# Patient Record
Sex: FEMALE | Race: WHITE | Employment: FULL TIME | URBAN - METROPOLITAN AREA
[De-identification: names, ages, dates, MRNs, and addresses within clinical notes are randomized per-mention and may not be internally consistent; named-entity substitution may affect disease eponyms.]

---

## 2017-10-20 DIAGNOSIS — C73 PAPILLARY CARCINOMA OF THYROID (HCC): ICD-10-CM

## 2017-10-20 LAB
ANION GAP SERPL CALCULATED.3IONS-SCNC: 13 MEQ/L (ref 7–13)
BUN BLDV-MCNC: 20 MG/DL (ref 8–23)
CALCIUM SERPL-MCNC: 9.5 MG/DL (ref 8.6–10.2)
CHLORIDE BLD-SCNC: 102 MEQ/L (ref 98–107)
CO2: 27 MEQ/L (ref 22–29)
CREAT SERPL-MCNC: 0.53 MG/DL (ref 0.5–0.9)
GFR AFRICAN AMERICAN: >60
GFR NON-AFRICAN AMERICAN: >60
GLUCOSE BLD-MCNC: 90 MG/DL (ref 74–109)
POTASSIUM SERPL-SCNC: 4.3 MEQ/L (ref 3.5–5.1)
SODIUM BLD-SCNC: 142 MEQ/L (ref 132–144)
T4 FREE: 1.72 NG/DL (ref 0.93–1.7)
TSH SERPL DL<=0.05 MIU/L-ACNC: 0.55 UIU/ML (ref 0.27–4.2)

## 2017-10-23 ENCOUNTER — OFFICE VISIT (OUTPATIENT)
Dept: SURGERY | Age: 77
End: 2017-10-23

## 2017-10-23 VITALS
HEIGHT: 65 IN | SYSTOLIC BLOOD PRESSURE: 132 MMHG | DIASTOLIC BLOOD PRESSURE: 70 MMHG | WEIGHT: 125 LBS | HEART RATE: 67 BPM | BODY MASS INDEX: 20.83 KG/M2

## 2017-10-23 DIAGNOSIS — E89.0 POSTOPERATIVE HYPOTHYROIDISM: ICD-10-CM

## 2017-10-23 DIAGNOSIS — C73 PAPILLARY CARCINOMA OF THYROID (HCC): Primary | ICD-10-CM

## 2017-10-23 LAB — THYROGLOBULIN BY LC-MS/MS, SERUM/PLASMA: <0.5 NG/ML (ref 1.3–31.8)

## 2017-10-23 PROCEDURE — 1090F PRES/ABSN URINE INCON ASSESS: CPT | Performed by: INTERNAL MEDICINE

## 2017-10-23 PROCEDURE — 4040F PNEUMOC VAC/ADMIN/RCVD: CPT | Performed by: INTERNAL MEDICINE

## 2017-10-23 PROCEDURE — 1036F TOBACCO NON-USER: CPT | Performed by: INTERNAL MEDICINE

## 2017-10-23 PROCEDURE — G8427 DOCREV CUR MEDS BY ELIG CLIN: HCPCS | Performed by: INTERNAL MEDICINE

## 2017-10-23 PROCEDURE — G8484 FLU IMMUNIZE NO ADMIN: HCPCS | Performed by: INTERNAL MEDICINE

## 2017-10-23 PROCEDURE — G8400 PT W/DXA NO RESULTS DOC: HCPCS | Performed by: INTERNAL MEDICINE

## 2017-10-23 PROCEDURE — 99213 OFFICE O/P EST LOW 20 MIN: CPT | Performed by: INTERNAL MEDICINE

## 2017-10-23 PROCEDURE — 1123F ACP DISCUSS/DSCN MKR DOCD: CPT | Performed by: INTERNAL MEDICINE

## 2017-10-23 PROCEDURE — G8420 CALC BMI NORM PARAMETERS: HCPCS | Performed by: INTERNAL MEDICINE

## 2017-10-23 RX ORDER — LEVOTHYROXINE SODIUM 75 MCG
TABLET ORAL
Qty: 30 TABLET | Refills: 11 | Status: SHIPPED | OUTPATIENT
Start: 2017-10-23 | End: 2018-10-22 | Stop reason: SDUPTHER

## 2017-10-23 NOTE — PROGRESS NOTES
Subjective:      Patient ID: Cinthya Escalante is a 69 y.o. female. Other   This is a chronic (hypothyroidism) problem. The current episode started more than 1 year ago. The problem has been unchanged. Associated symptoms include fatigue. Exacerbated by: total thyroidectomy. Treatments tried: synthyroid. The treatment provided moderate relief. Pt c/p dry mouth and dry eyes     Patient Active Problem List   Diagnosis    Papillary carcinoma of thyroid ([address]    Hypothyroidism    Hashimoto's thyroiditis       Current Outpatient Prescriptions:     SYNTHROID 75 MCG tablet, TAKE ONE TABLET BY MOUTH EVERY DAY, Disp: 30 tablet, Rfl: 11    lisinopril (PRINIVIL;ZESTRIL) 20 MG tablet, Take 20 mg by mouth daily, Disp: , Rfl:     atorvastatin (LIPITOR) 20 MG tablet, Take 20 mg by mouth daily. , Disp: , Rfl:       Review of Systems   Constitutional: Positive for fatigue. All other systems reviewed and are negative. Vitals:    10/23/17 0859 10/23/17 0905   BP: (!) 150/71 132/70   Site: Right Arm    Position: Sitting    Cuff Size: Medium Adult    Pulse: 67    Weight: 125 lb (56.7 kg)    Height: 5' 5\" (1.651 m)        Objective:   Physical Exam   Constitutional: She appears well-developed and well-nourished. HENT:   Head: Normocephalic and atraumatic. Eyes: Conjunctivae are normal.   Neck: Neck supple. Cardiovascular: Normal rate and normal heart sounds. Pulmonary/Chest: Effort normal and breath sounds normal.   Musculoskeletal: Normal range of motion. Neurological: She is alert. Skin: Skin is warm. Psychiatric: She has a normal mood and affect. Results for Jennifer Garcia (MRN [id number]) as of 10/23/2017 09:20   Ref.  Range 10/20/2017 09:16   Sodium Latest Ref Range: 132 - 144 mEq/L 142   Potassium Latest Ref Range: 3.5 - 5.1 mEq/L 4.3   Chloride Latest Ref Range: 98 - 107 mEq/L 102   CO2 Latest Ref Range: 22 - 29 mEq/L 27   BUN Latest Ref Range: 8 - 23 mg/dL 20   Creatinine Latest Ref Range: 0.50 - 0.90 mg/dL 0.53   Anion Gap Latest Ref Range: 7 - 13 mEq/L 13   GFR Non- Latest Ref Range: >60  >60.0   GFR African American Latest Ref Range: >60  >60.0   Glucose Latest Ref Range: 74 - 109 mg/dL 90   Calcium Latest Ref Range: 8.6 - 10.2 mg/dL 9.5   TSH Latest Ref Range: 0.270 - 4.200 uIU/mL 0.551   T4 Free Latest Ref Range: 0.93 - 1.70 ng/dL 1.72 (H)       Assessment:      1. Papillary carcinoma of thyroid (HCC)  T4, Free    TSH without Reflex    Thyroglobulin   2.  Postoperative hypothyroidism              Plan:      Orders Placed This Encounter   Procedures    T4, Free     Standing Status:   Future     Standing Expiration Date:   10/23/2018    TSH without Reflex     Standing Status:   Future     Standing Expiration Date:   10/23/2018    Thyroglobulin     Standing Status:   Future     Standing Expiration Date:   10/23/2018     Orders Placed This Encounter   Medications    SYNTHROID 75 MCG tablet     Sig: TAKE ONE TABLET BY MOUTH EVERY DAY     Dispense:  30 tablet     Refill:  11     F/u in 12 months

## 2018-10-22 ENCOUNTER — OFFICE VISIT (OUTPATIENT)
Dept: ENDOCRINOLOGY | Age: 78
End: 2018-10-22
Payer: MEDICARE

## 2018-10-22 VITALS
HEIGHT: 65 IN | SYSTOLIC BLOOD PRESSURE: 122 MMHG | OXYGEN SATURATION: 98 % | RESPIRATION RATE: 16 BRPM | BODY MASS INDEX: 19.76 KG/M2 | HEART RATE: 65 BPM | WEIGHT: 118.6 LBS | DIASTOLIC BLOOD PRESSURE: 60 MMHG

## 2018-10-22 DIAGNOSIS — E89.0 POSTOPERATIVE HYPOTHYROIDISM: Primary | ICD-10-CM

## 2018-10-22 DIAGNOSIS — R63.4 WEIGHT LOSS: ICD-10-CM

## 2018-10-22 DIAGNOSIS — C73 PAPILLARY CARCINOMA OF THYROID (HCC): ICD-10-CM

## 2018-10-22 LAB
T4 FREE: 1.85 NG/DL (ref 0.93–1.7)
TSH SERPL DL<=0.05 MIU/L-ACNC: 0.38 UIU/ML (ref 0.27–4.2)

## 2018-10-22 PROCEDURE — G8427 DOCREV CUR MEDS BY ELIG CLIN: HCPCS | Performed by: INTERNAL MEDICINE

## 2018-10-22 PROCEDURE — 1123F ACP DISCUSS/DSCN MKR DOCD: CPT | Performed by: INTERNAL MEDICINE

## 2018-10-22 PROCEDURE — 99213 OFFICE O/P EST LOW 20 MIN: CPT | Performed by: INTERNAL MEDICINE

## 2018-10-22 PROCEDURE — 1101F PT FALLS ASSESS-DOCD LE1/YR: CPT | Performed by: INTERNAL MEDICINE

## 2018-10-22 PROCEDURE — 1090F PRES/ABSN URINE INCON ASSESS: CPT | Performed by: INTERNAL MEDICINE

## 2018-10-22 PROCEDURE — 4040F PNEUMOC VAC/ADMIN/RCVD: CPT | Performed by: INTERNAL MEDICINE

## 2018-10-22 PROCEDURE — G8484 FLU IMMUNIZE NO ADMIN: HCPCS | Performed by: INTERNAL MEDICINE

## 2018-10-22 PROCEDURE — 1036F TOBACCO NON-USER: CPT | Performed by: INTERNAL MEDICINE

## 2018-10-22 PROCEDURE — G8400 PT W/DXA NO RESULTS DOC: HCPCS | Performed by: INTERNAL MEDICINE

## 2018-10-22 PROCEDURE — G8420 CALC BMI NORM PARAMETERS: HCPCS | Performed by: INTERNAL MEDICINE

## 2018-10-22 RX ORDER — LEVOTHYROXINE SODIUM 75 MCG
TABLET ORAL
Qty: 30 TABLET | Refills: 11 | Status: SHIPPED | OUTPATIENT
Start: 2018-10-22 | End: 2019-10-22 | Stop reason: SDUPTHER

## 2018-10-28 LAB — THYROGLOBULIN BY LC-MS/MS, SERUM/PLASMA: <0.5 NG/ML (ref 1.3–31.8)

## 2018-11-08 ENCOUNTER — TELEPHONE (OUTPATIENT)
Dept: ENDOCRINOLOGY | Age: 78
End: 2018-11-08

## 2018-11-08 PROBLEM — C91.10 CHRONIC LYMPHOCYTIC LEUKEMIA OF B-CELL TYPE NOT HAVING ACHIEVED REMISSION (HCC): Status: ACTIVE | Noted: 2018-11-08

## 2018-11-19 DIAGNOSIS — C91.10 CHRONIC LYMPHOCYTIC LEUKEMIA OF B-CELL TYPE NOT HAVING ACHIEVED REMISSION (HCC): ICD-10-CM

## 2018-11-19 LAB
ALBUMIN SERPL-MCNC: 4.3 G/DL (ref 3.9–4.9)
ALP BLD-CCNC: 53 U/L (ref 40–130)
ALT SERPL-CCNC: 17 U/L (ref 0–33)
ANION GAP SERPL CALCULATED.3IONS-SCNC: 12 MEQ/L (ref 7–13)
AST SERPL-CCNC: 21 U/L (ref 0–35)
BILIRUB SERPL-MCNC: 0.5 MG/DL (ref 0–1.2)
BUN BLDV-MCNC: 20 MG/DL (ref 8–23)
CALCIUM SERPL-MCNC: 10.2 MG/DL (ref 8.6–10.2)
CHLORIDE BLD-SCNC: 105 MEQ/L (ref 98–107)
CO2: 26 MEQ/L (ref 22–29)
CREAT SERPL-MCNC: 0.56 MG/DL (ref 0.5–0.9)
GFR AFRICAN AMERICAN: >60
GFR NON-AFRICAN AMERICAN: >60
GLOBULIN: 2.5 G/DL (ref 2.3–3.5)
GLUCOSE BLD-MCNC: 69 MG/DL (ref 74–109)
POTASSIUM SERPL-SCNC: 4.8 MEQ/L (ref 3.5–5.1)
SODIUM BLD-SCNC: 143 MEQ/L (ref 132–144)
TOTAL PROTEIN: 6.8 G/DL (ref 6.4–8.1)

## 2019-01-09 ENCOUNTER — TELEPHONE (OUTPATIENT)
Dept: INTERNAL MEDICINE CLINIC | Age: 79
End: 2019-01-09

## 2019-05-01 ENCOUNTER — TELEPHONE (OUTPATIENT)
Dept: ENDOCRINOLOGY | Age: 79
End: 2019-05-01

## 2019-05-01 NOTE — TELEPHONE ENCOUNTER
Patient calls with questions on dose for levothyroxine. States that Dr. Harley Shields sent over a note regarding same. She would like to speak to  To lower her dose because she has lost weight.

## 2019-05-02 DIAGNOSIS — E03.9 ACQUIRED HYPOTHYROIDISM: Primary | ICD-10-CM

## 2019-05-03 DIAGNOSIS — E89.0 POSTOPERATIVE HYPOTHYROIDISM: ICD-10-CM

## 2019-05-03 DIAGNOSIS — E03.9 ACQUIRED HYPOTHYROIDISM: ICD-10-CM

## 2019-05-03 LAB
T4 FREE: 1.6 NG/DL (ref 0.84–1.68)
TSH SERPL DL<=0.05 MIU/L-ACNC: 0.32 UIU/ML (ref 0.44–3.86)

## 2019-05-04 LAB — THYROGLOBULIN AB: 23.1 IU/ML (ref 0–4)

## 2019-05-08 ENCOUNTER — TELEPHONE (OUTPATIENT)
Dept: ENDOCRINOLOGY | Age: 79
End: 2019-05-08

## 2019-10-11 DIAGNOSIS — E89.0 POSTOPERATIVE HYPOTHYROIDISM: ICD-10-CM

## 2019-10-11 LAB
T4 FREE: 1.49 NG/DL (ref 0.84–1.68)
TSH SERPL DL<=0.05 MIU/L-ACNC: 1.96 UIU/ML (ref 0.44–3.86)

## 2019-10-21 LAB — THYROGLOBULIN BY LC-MS/MS, SERUM/PLASMA: <0.5 NG/ML (ref 1.3–31.8)

## 2019-10-22 ENCOUNTER — OFFICE VISIT (OUTPATIENT)
Dept: ENDOCRINOLOGY | Age: 79
End: 2019-10-22
Payer: MEDICARE

## 2019-10-22 VITALS
HEIGHT: 65 IN | SYSTOLIC BLOOD PRESSURE: 131 MMHG | WEIGHT: 119 LBS | DIASTOLIC BLOOD PRESSURE: 70 MMHG | BODY MASS INDEX: 19.83 KG/M2 | HEART RATE: 72 BPM

## 2019-10-22 DIAGNOSIS — R63.4 WEIGHT LOSS: ICD-10-CM

## 2019-10-22 DIAGNOSIS — E89.0 POSTOPERATIVE HYPOTHYROIDISM: Primary | ICD-10-CM

## 2019-10-22 PROCEDURE — G8400 PT W/DXA NO RESULTS DOC: HCPCS | Performed by: INTERNAL MEDICINE

## 2019-10-22 PROCEDURE — 4040F PNEUMOC VAC/ADMIN/RCVD: CPT | Performed by: INTERNAL MEDICINE

## 2019-10-22 PROCEDURE — G8420 CALC BMI NORM PARAMETERS: HCPCS | Performed by: INTERNAL MEDICINE

## 2019-10-22 PROCEDURE — 1036F TOBACCO NON-USER: CPT | Performed by: INTERNAL MEDICINE

## 2019-10-22 PROCEDURE — 1123F ACP DISCUSS/DSCN MKR DOCD: CPT | Performed by: INTERNAL MEDICINE

## 2019-10-22 PROCEDURE — G8484 FLU IMMUNIZE NO ADMIN: HCPCS | Performed by: INTERNAL MEDICINE

## 2019-10-22 PROCEDURE — 1090F PRES/ABSN URINE INCON ASSESS: CPT | Performed by: INTERNAL MEDICINE

## 2019-10-22 PROCEDURE — 99213 OFFICE O/P EST LOW 20 MIN: CPT | Performed by: INTERNAL MEDICINE

## 2019-10-22 PROCEDURE — G8427 DOCREV CUR MEDS BY ELIG CLIN: HCPCS | Performed by: INTERNAL MEDICINE

## 2019-10-22 RX ORDER — LEVOTHYROXINE SODIUM 75 MCG
TABLET ORAL
Qty: 30 TABLET | Refills: 11 | Status: SHIPPED | OUTPATIENT
Start: 2019-10-22 | End: 2019-10-22 | Stop reason: SDUPTHER

## 2019-10-22 RX ORDER — LEVOTHYROXINE SODIUM 0.07 MG/1
TABLET ORAL
Qty: 30 TABLET | Refills: 11 | Status: SHIPPED | OUTPATIENT
Start: 2019-10-22 | End: 2020-10-22 | Stop reason: SDUPTHER

## 2020-10-12 DIAGNOSIS — E89.0 POSTOPERATIVE HYPOTHYROIDISM: ICD-10-CM

## 2020-10-12 LAB
T4 FREE: 1.5 NG/DL (ref 0.84–1.68)
TSH SERPL DL<=0.05 MIU/L-ACNC: 1.65 UIU/ML (ref 0.44–3.86)

## 2020-10-16 LAB — THYROGLOBULIN BY LC-MS/MS, SERUM/PLASMA: <0.5 NG/ML (ref 1.3–31.8)

## 2020-10-22 ENCOUNTER — OFFICE VISIT (OUTPATIENT)
Dept: ENDOCRINOLOGY | Age: 80
End: 2020-10-22
Payer: MEDICARE

## 2020-10-22 VITALS
BODY MASS INDEX: 20.33 KG/M2 | SYSTOLIC BLOOD PRESSURE: 134 MMHG | HEART RATE: 60 BPM | DIASTOLIC BLOOD PRESSURE: 79 MMHG | WEIGHT: 122 LBS | OXYGEN SATURATION: 98 % | HEIGHT: 65 IN

## 2020-10-22 PROCEDURE — G8484 FLU IMMUNIZE NO ADMIN: HCPCS | Performed by: INTERNAL MEDICINE

## 2020-10-22 PROCEDURE — 1090F PRES/ABSN URINE INCON ASSESS: CPT | Performed by: INTERNAL MEDICINE

## 2020-10-22 PROCEDURE — 1123F ACP DISCUSS/DSCN MKR DOCD: CPT | Performed by: INTERNAL MEDICINE

## 2020-10-22 PROCEDURE — 1036F TOBACCO NON-USER: CPT | Performed by: INTERNAL MEDICINE

## 2020-10-22 PROCEDURE — G8427 DOCREV CUR MEDS BY ELIG CLIN: HCPCS | Performed by: INTERNAL MEDICINE

## 2020-10-22 PROCEDURE — G8420 CALC BMI NORM PARAMETERS: HCPCS | Performed by: INTERNAL MEDICINE

## 2020-10-22 PROCEDURE — G8400 PT W/DXA NO RESULTS DOC: HCPCS | Performed by: INTERNAL MEDICINE

## 2020-10-22 PROCEDURE — 99213 OFFICE O/P EST LOW 20 MIN: CPT | Performed by: INTERNAL MEDICINE

## 2020-10-22 PROCEDURE — 4040F PNEUMOC VAC/ADMIN/RCVD: CPT | Performed by: INTERNAL MEDICINE

## 2020-10-22 RX ORDER — LEVOTHYROXINE SODIUM 0.07 MG/1
TABLET ORAL
Qty: 30 TABLET | Refills: 11 | Status: SHIPPED | OUTPATIENT
Start: 2020-10-22 | End: 2021-10-21 | Stop reason: SDUPTHER

## 2020-10-22 NOTE — PROGRESS NOTES
Subjective:      Patient ID: Unruly Munson is a 79 y.o. female. 12 follow-up on hypothyroidism history of thyroidectomy for papillary thyroid carcinoma labs including thyroglobulin levels have been stable requesting refills  Other   This is a chronic (Hypothyroidism) problem. The current episode started more than 1 year ago. The problem has been unchanged. Exacerbated by: Thyroidectomy. Treatments tried: Levothyroxine. The treatment provided moderate relief. Results for Nilda Tripp (MRN [id number]) as of 10/22/2020 09:44   Ref. Range 10/11/2019 09:54 10/12/2020 08:27   TSH Latest Ref Range: 0.440 - 3.860 uIU/mL 1.960 1.650   T4 Free Latest Ref Range: 0.84 - 1.68 ng/dL 1.49 1.50   Thyroglobulin by LC-MS/MS, Serum/Plasma Latest Ref Range: 1.3 - 31.8 ng/mL <0.5 (L) <0.5 (L)     Patient Active Problem List   Diagnosis    Papillary carcinoma of thyroid (HCC)    Hypothyroidism    Hashimoto's thyroiditis    Chronic lymphocytic leukemia of B-cell type not having achieved remission (HCC)     No Known Allergies      Current Outpatient Medications:     levothyroxine (SYNTHROID) 75 MCG tablet, TAKE ONE TABLET BY MOUTH 6 days a week, Disp: 30 tablet, Rfl: 11    calcium carbonate 600 MG TABS tablet, Take 2 tablets by mouth daily, Disp: , Rfl:     Omega-3 Fatty Acids (FISH OIL) 1000 MG CAPS, Take 1,000 mg by mouth 2 times daily, Disp: , Rfl:     Multiple Vitamins-Minerals (MULTIVITAMIN WOMEN 50+) TABS, Take 1 tablet by mouth daily, Disp: , Rfl:     vitamin B-12 (CYANOCOBALAMIN) 1000 MCG tablet, Take 1,000 mcg by mouth daily, Disp: , Rfl:     lisinopril (PRINIVIL;ZESTRIL) 20 MG tablet, Take 20 mg by mouth daily, Disp: , Rfl:     atorvastatin (LIPITOR) 20 MG tablet, Take 20 mg by mouth daily. , Disp: , Rfl:     Review of Systems   Endocrine: Negative. All other systems reviewed and are negative.       Vitals:    10/22/20 0925   BP: 134/79   Pulse: 60   SpO2: 98%   Weight: 122 lb (55.3 kg)   Height: 5' 5\" (1.651 m)       Objective:   Physical Exam  Constitutional:       Appearance: Normal appearance. She is normal weight. HENT:      Head: Normocephalic and atraumatic. Neck:      Musculoskeletal: Normal range of motion and neck supple. Cardiovascular:      Rate and Rhythm: Normal rate. Musculoskeletal: Normal range of motion. Neurological:      General: No focal deficit present. Mental Status: She is alert. Psychiatric:         Mood and Affect: Mood is anxious. Assessment:       Diagnosis Orders   1.  Postoperative hypothyroidism  T4, Free    TSH with Reflex    Thyroglobulin    Anti-Thyroglobulin Antibody    levothyroxine (SYNTHROID) 75 MCG tablet           Plan:      Orders Placed This Encounter   Procedures    T4, Free     Standing Status:   Future     Standing Expiration Date:   10/22/2021    TSH with Reflex     Standing Status:   Future     Standing Expiration Date:   10/22/2021    Thyroglobulin     Standing Status:   Future     Standing Expiration Date:   10/22/2021    Anti-Thyroglobulin Antibody     Standing Status:   Future     Standing Expiration Date:   10/22/2021     Orders Placed This Encounter   Medications    levothyroxine (SYNTHROID) 75 MCG tablet     Sig: TAKE ONE TABLET BY MOUTH 6 days a week     Dispense:  30 tablet     Refill:  Delano Morales MD

## 2021-10-21 ENCOUNTER — OFFICE VISIT (OUTPATIENT)
Dept: ENDOCRINOLOGY | Age: 81
End: 2021-10-21
Payer: MEDICARE

## 2021-10-21 VITALS
BODY MASS INDEX: 20.16 KG/M2 | OXYGEN SATURATION: 96 % | HEART RATE: 71 BPM | HEIGHT: 65 IN | DIASTOLIC BLOOD PRESSURE: 75 MMHG | WEIGHT: 121 LBS | SYSTOLIC BLOOD PRESSURE: 132 MMHG

## 2021-10-21 DIAGNOSIS — E89.0 POSTOPERATIVE HYPOTHYROIDISM: Primary | ICD-10-CM

## 2021-10-21 DIAGNOSIS — C73 THYROID CANCER (HCC): ICD-10-CM

## 2021-10-21 PROCEDURE — 4040F PNEUMOC VAC/ADMIN/RCVD: CPT | Performed by: INTERNAL MEDICINE

## 2021-10-21 PROCEDURE — 1123F ACP DISCUSS/DSCN MKR DOCD: CPT | Performed by: INTERNAL MEDICINE

## 2021-10-21 PROCEDURE — 1036F TOBACCO NON-USER: CPT | Performed by: INTERNAL MEDICINE

## 2021-10-21 PROCEDURE — G8484 FLU IMMUNIZE NO ADMIN: HCPCS | Performed by: INTERNAL MEDICINE

## 2021-10-21 PROCEDURE — G8420 CALC BMI NORM PARAMETERS: HCPCS | Performed by: INTERNAL MEDICINE

## 2021-10-21 PROCEDURE — 99213 OFFICE O/P EST LOW 20 MIN: CPT | Performed by: INTERNAL MEDICINE

## 2021-10-21 PROCEDURE — G8400 PT W/DXA NO RESULTS DOC: HCPCS | Performed by: INTERNAL MEDICINE

## 2021-10-21 PROCEDURE — 1090F PRES/ABSN URINE INCON ASSESS: CPT | Performed by: INTERNAL MEDICINE

## 2021-10-21 PROCEDURE — G8427 DOCREV CUR MEDS BY ELIG CLIN: HCPCS | Performed by: INTERNAL MEDICINE

## 2021-10-21 RX ORDER — CHLORAL HYDRATE 500 MG
1000 CAPSULE ORAL
COMMUNITY

## 2021-10-21 RX ORDER — LEVOTHYROXINE SODIUM 0.07 MG/1
TABLET ORAL
Qty: 30 TABLET | Refills: 11 | Status: SHIPPED | OUTPATIENT
Start: 2021-10-21 | End: 2022-10-20 | Stop reason: SDUPTHER

## 2021-10-21 RX ORDER — ATORVASTATIN CALCIUM 10 MG/1
TABLET, FILM COATED ORAL
COMMUNITY
Start: 2021-10-19

## 2021-10-21 RX ORDER — LISINOPRIL 20 MG/1
20 TABLET ORAL DAILY
COMMUNITY
Start: 2021-08-30 | End: 2022-10-20

## 2021-10-21 RX ORDER — LEVOTHYROXINE SODIUM 0.07 MG/1
TABLET ORAL
Qty: 30 TABLET | Refills: 11 | Status: SHIPPED | OUTPATIENT
Start: 2021-10-21 | End: 2021-10-21 | Stop reason: SDUPTHER

## 2021-10-21 ASSESSMENT — ENCOUNTER SYMPTOMS: SWOLLEN GLANDS: 0

## 2021-10-21 NOTE — PROGRESS NOTES
10/21/2021    Assessment:       Diagnosis Orders   1. Postoperative hypothyroidism  T4, Free    TSH without Reflex    Anti-Thyroglobulin Antibody    Thyroglobulin    levothyroxine (SYNTHROID) 75 MCG tablet    DISCONTINUED: levothyroxine (SYNTHROID) 75 MCG tablet   2. Thyroid cancer ([address]           PLAN:     Orders Placed This Encounter   Procedures    T4, Free     Standing Status:   Future     Standing Expiration Date:   10/21/2022    TSH without Reflex     Standing Status:   Future     Standing Expiration Date:   10/21/2022    Anti-Thyroglobulin Antibody     Standing Status:   Future     Standing Expiration Date:   10/21/2022    Thyroglobulin     Standing Status:   Future     Standing Expiration Date:   10/21/2022     Continue Synthroid 75 mcg 6 days a week follow-up in 12 months        Subjective:     Chief Complaint   Patient presents with    Hypothyroidism     Vitals:    10/21/21 0859   BP: 132/75   Pulse: 71   SpO2: 96%   Weight: 121 lb (54.9 kg)   Height: 5' 5\" (1.651 m)     Wt Readings from Last 3 Encounters:   10/21/21 121 lb (54.9 kg)   10/22/20 122 lb (55.3 kg)   10/22/19 119 lb (54 kg)     BP Readings from Last 3 Encounters:   10/21/21 132/75   10/22/20 134/79   10/22/19 131/70     Follow-up on hypothyroidism status post thyroidectomy for thyroid carcinoma labs reviewed thyroglobulin level has been stable denies any neck pain heat or cold intolerance    Other  This is a chronic (Hypothyroidism) problem. The current episode started more than 1 year ago. The problem has been waxing and waning. Associated symptoms include fatigue. Pertinent negatives include no neck pain or swollen glands. Exacerbated by: Thyroidectomy. Treatments tried: Synthroid. The treatment provided moderate relief. Results for [employer] (MRN [id number]) as of 10/21/2021 09:33   Ref.  Range 10/11/2019 09:54 10/12/2020 08:27 10/12/2021 09:00   TSH Latest Ref Range: 0.440 - 3.860 uIU/mL 1.960 1.650 0.579   T4 Free Latest Ref Range: 0.84 - 1.68 ng/dL 1.49 1.50 1.46   Thyroglobulin Antibody Latest Ref Range: 0.0 - 40.0 IU/mL   23.0   Thyroglobulin by LC-MS/MS, Serum/Plasma Latest Ref Range: 1.3 - 31.8 ng/mL <0.5 (L) <0.5 (L) <0.5 (L)       Past Medical History:   Diagnosis Date    Arthritis     Cancer ([facility]     thyroid,papillary type    Hypothyroidism     Osteopenia      Past Surgical History:   Procedure Laterality Date    APPENDECTOMY      BREAST BIOPSY Right     BENIGN    CATARACT REMOVAL Bilateral 07/2016    LITHOTRIPSY      KIDNEY STONES    TOTAL KNEE ARTHROPLASTY Right 01/14/2019     Social History     Socioeconomic History    Marital status:      Spouse name: Not on file    Number of children: Not on file    Years of education: Not on file    Highest education level: Not on file   Occupational History    Not on file   Tobacco Use    Smoking status: Never Smoker    Smokeless tobacco: Never Used   Substance and Sexual Activity    Alcohol use: No    Drug use: Not on file    Sexual activity: Not on file   Other Topics Concern    Not on file   Social History Narrative    Not on file     Social Determinants of Health     Financial Resource Strain:     Difficulty of Paying Living Expenses:    Food Insecurity:     Worried About Running Out of Food in the Last Year:     Ran Out of Food in the Last Year:    Transportation Needs:     Lack of Transportation (Medical):      Lack of Transportation (Non-Medical):    Physical Activity:     Days of Exercise per Week:     Minutes of Exercise per Session:    Stress:     Feeling of Stress :    Social Connections:     Frequency of Communication with Friends and Family:     Frequency of Social Gatherings with Friends and Family:     Attends Congregational Services:     Active Member of Clubs or Organizations:     Attends Club or Organization Meetings:     Marital Status:    Intimate Partner Violence:     Fear of Current or Ex-Partner:     Emotionally Abused:     Physically Abused:     Sexually Abused:      No family history on file. No Known Allergies    Current Outpatient Medications:     atorvastatin (LIPITOR) 10 MG tablet, , Disp: , Rfl:     Omega-3 Fatty Acids (FISH OIL) 1000 MG CAPS, Take 1,000 mg by mouth, Disp: , Rfl:     levothyroxine (SYNTHROID) 75 MCG tablet, TAKE ONE TABLET BY MOUTH 6 days a week, Disp: 30 tablet, Rfl: 11    calcium carbonate 600 MG TABS tablet, Take 2 tablets by mouth daily, Disp: , Rfl:     Multiple Vitamins-Minerals (MULTIVITAMIN WOMEN 50+) TABS, Take 1 tablet by mouth daily, Disp: , Rfl:     vitamin B-12 (CYANOCOBALAMIN) 1000 MCG tablet, Take 1,000 mcg by mouth daily, Disp: , Rfl:     lisinopril (PRINIVIL;ZESTRIL) 20 MG tablet, Take 20 mg by mouth daily, Disp: , Rfl:     atorvastatin (LIPITOR) 20 MG tablet, Take 20 mg by mouth daily.   , Disp: , Rfl:     calcium carbonate 1500 (600 Ca) MG TABS tablet, Take 2 tablets by mouth (Patient not taking: Reported on 10/21/2021), Disp: , Rfl:     lisinopril (PRINIVIL;ZESTRIL) 20 MG tablet, Take 20 mg by mouth daily (Patient not taking: Reported on 10/21/2021), Disp: , Rfl:     Omega-3 Fatty Acids (FISH OIL) 1000 MG CAPS, Take 1,000 mg by mouth 2 times daily (Patient not taking: Reported on 10/21/2021), Disp: , Rfl:   Lab Results   Component Value Date     04/30/2019    K 5.0 (H) 04/30/2019     04/30/2019    CO2 28 04/30/2019    BUN 19 04/30/2019    CREATININE 0.55 04/30/2019    GLUCOSE 96 04/30/2019    CALCIUM 9.6 04/30/2019    PROT 6.6 04/30/2019    LABALBU 4.3 04/30/2019    BILITOT 0.5 04/30/2019    ALKPHOS 66 04/30/2019    AST 25 04/30/2019    ALT 22 04/30/2019    LABGLOM >60.0 04/30/2019    GFRAA >60.0 04/30/2019    GLOB 2.3 04/30/2019     Lab Results   Component Value Date    WBC 8.7 07/30/2019    HGB 13.2 07/30/2019    HCT 40.3 07/30/2019     07/30/2019     No results found for: LABA1C  No results found for: CHOLFAST, TRIGLYCFAST, HDL, LDLCALC, CHOL, TRIG  No results found for: TESTM  Lab Results   Component Value Date    TSH 1.650 10/12/2020    TSH 1.960 10/11/2019    TSH 0.317 (L) 05/03/2019    TSHREFLEX 0.579 10/12/2021    T4FREE 1.46 10/12/2021    T4FREE 1.50 10/12/2020    T4FREE 1.49 10/11/2019     No results found for: TPOABS    Review of Systems   Constitutional: Positive for fatigue. Endocrine: Negative. Musculoskeletal: Negative for neck pain. Hematological: Negative. All other systems reviewed and are negative. Objective:   Physical Exam  Vitals reviewed. Constitutional:       Appearance: Normal appearance. She is normal weight. HENT:      Head: Normocephalic and atraumatic. Hair is normal.      Right Ear: External ear normal.      Left Ear: External ear normal.      Nose: Nose normal.   Eyes:      General: No scleral icterus. Right eye: No discharge. Left eye: No discharge. Extraocular Movements: Extraocular movements intact. Conjunctiva/sclera: Conjunctivae normal.   Neck:      Trachea: Trachea normal.   Cardiovascular:      Rate and Rhythm: Normal rate. Musculoskeletal:         General: Normal range of motion. Cervical back: Normal range of motion and neck supple. Neurological:      General: No focal deficit present. Mental Status: She is alert and oriented to person, place, and time.    Psychiatric:         Mood and Affect: Mood normal.         Behavior: Behavior normal.

## 2022-10-12 DIAGNOSIS — E89.0 POSTOPERATIVE HYPOTHYROIDISM: ICD-10-CM

## 2022-10-12 LAB
T4 FREE: 1.73 NG/DL (ref 0.84–1.68)
TSH SERPL DL<=0.05 MIU/L-ACNC: 0.65 UIU/ML (ref 0.44–3.86)

## 2022-10-13 LAB — THYROGLOBULIN ANTIBODY: 25 IU/ML (ref 0–40)

## 2022-10-18 LAB — THYROGLOBULIN BY LC-MS/MS, SERUM/PLASMA: <0.5 NG/ML (ref 1.3–31.8)

## 2022-10-20 ENCOUNTER — OFFICE VISIT (OUTPATIENT)
Dept: ENDOCRINOLOGY | Age: 82
End: 2022-10-20
Payer: MEDICARE

## 2022-10-20 VITALS
HEART RATE: 71 BPM | WEIGHT: 123 LBS | SYSTOLIC BLOOD PRESSURE: 156 MMHG | DIASTOLIC BLOOD PRESSURE: 72 MMHG | BODY MASS INDEX: 20.49 KG/M2 | HEIGHT: 65 IN | OXYGEN SATURATION: 99 %

## 2022-10-20 DIAGNOSIS — C73 THYROID CANCER (HCC): ICD-10-CM

## 2022-10-20 DIAGNOSIS — E89.0 POSTOPERATIVE HYPOTHYROIDISM: Primary | ICD-10-CM

## 2022-10-20 PROCEDURE — 1123F ACP DISCUSS/DSCN MKR DOCD: CPT | Performed by: INTERNAL MEDICINE

## 2022-10-20 PROCEDURE — 99213 OFFICE O/P EST LOW 20 MIN: CPT | Performed by: INTERNAL MEDICINE

## 2022-10-20 PROCEDURE — 1090F PRES/ABSN URINE INCON ASSESS: CPT | Performed by: INTERNAL MEDICINE

## 2022-10-20 PROCEDURE — 1036F TOBACCO NON-USER: CPT | Performed by: INTERNAL MEDICINE

## 2022-10-20 PROCEDURE — G8400 PT W/DXA NO RESULTS DOC: HCPCS | Performed by: INTERNAL MEDICINE

## 2022-10-20 PROCEDURE — G8427 DOCREV CUR MEDS BY ELIG CLIN: HCPCS | Performed by: INTERNAL MEDICINE

## 2022-10-20 PROCEDURE — G8484 FLU IMMUNIZE NO ADMIN: HCPCS | Performed by: INTERNAL MEDICINE

## 2022-10-20 PROCEDURE — G8420 CALC BMI NORM PARAMETERS: HCPCS | Performed by: INTERNAL MEDICINE

## 2022-10-20 RX ORDER — LEVOTHYROXINE SODIUM 0.07 MG/1
TABLET ORAL
Qty: 30 TABLET | Refills: 11 | Status: SHIPPED | OUTPATIENT
Start: 2022-10-20

## 2022-10-20 NOTE — PROGRESS NOTES
10/20/2022    Assessment:       Diagnosis Orders   1. Postoperative hypothyroidism  Basic Metabolic Panel    TSH with Reflex    T4, Free    levothyroxine (SYNTHROID) 75 MCG tablet      2. Thyroid cancer ([address]  Thyroglobulin    Anti-Thyroglobulin Antibody            PLAN:           Orders Placed This Encounter   Procedures    Basic Metabolic Panel     Standing Status:   Future     Standing Expiration Date:   10/20/2023    TSH with Reflex     Standing Status:   Future     Standing Expiration Date:   10/20/2023    T4, Free     Standing Status:   Future     Standing Expiration Date:   10/20/2023    Thyroglobulin     Standing Status:   Future     Standing Expiration Date:   10/20/2023    Anti-Thyroglobulin Antibody     Standing Status:   Future     Standing Expiration Date:   10/20/2023     Orders Placed This Encounter   Medications    levothyroxine (SYNTHROID) 75 MCG tablet     Sig: TAKE ONE TABLET BY MOUTH 6 days a week     Dispense:  30 tablet     Refill:  11     Return in about 1 year (around 10/20/2023). Subjective:     Chief Complaint   Patient presents with    Hypothyroidism     Vitals:    10/20/22 0857 10/20/22 0902   BP: (!) 144/77 (!) 156/72   Site: Left Upper Arm Right Upper Arm   Position: Sitting Sitting   Cuff Size: Medium Adult Medium Adult   Pulse: 71    SpO2: 99%    Weight: 123 lb (55.8 kg)    Height: 5' 5\" (1.651 m)      Wt Readings from Last 3 Encounters:   10/20/22 123 lb (55.8 kg)   10/21/21 121 lb (54.9 kg)   10/22/20 122 lb (55.3 kg)     BP Readings from Last 3 Encounters:   10/20/22 (!) 156/72   10/21/21 132/75   10/22/20 134/79     Follow-up on type postoperative for thyroid cancer papillary thyroglobulin is stable free T4 slightly elevated denies any heat or cold intolerance neck currently on levothyroxine 6 days a week requesting refills    Thyroid Problem  Presents for follow-up visit. Patient reports no cold intolerance or heat intolerance. The symptoms have been stable.       Latest Reference Range & Units 10/12/21 09:00 10/12/22 10:04   TSH 0.440 - 3.860 uIU/mL 0.579 0.654   T4 Free 0.84 - 1.68 ng/dL 1.46 1.73 (H)   Thyroglobulin Antibody 0.0 - 40.0 IU/mL 23.0 25.0   Thyroglobulin by LC-MS/MS, Serum/Plasma 1.3 - 31.8 ng/mL <0.5 (L) <0.5 (L)   (H): Data is abnormally high  (L): Data is abnormally low    Past Medical History:   Diagnosis Date    Arthritis     Cancer ([facility] [address]     thyroid,papillary type    Hypothyroidism     Osteopenia      Past Surgical History:   Procedure Laterality Date    APPENDECTOMY      BREAST BIOPSY Right     BENIGN    CATARACT REMOVAL Bilateral 07/2016    LITHOTRIPSY      KIDNEY STONES    TOTAL KNEE ARTHROPLASTY Right 01/14/2019     Social History     Socioeconomic History    Marital status:      Spouse name: Not on file    Number of children: Not on file    Years of education: Not on file    Highest education level: Not on file   Occupational History    Not on file   Tobacco Use    Smoking status: Never    Smokeless tobacco: Never   Substance and Sexual Activity    Alcohol use: No    Drug use: Not on file    Sexual activity: Not on file   Other Topics Concern    Not on file   Social History Narrative    Not on file     Social Determinants of Health     Financial Resource Strain: Not on file   Food Insecurity: Not on file   Transportation Needs: Not on file   Physical Activity: Not on file   Stress: Not on file   Social Connections: Not on file   Intimate Partner Violence: Not on file   Housing Stability: Not on file     No family history on file.   No Known Allergies    Current Outpatient Medications:     levothyroxine (SYNTHROID) 75 MCG tablet, TAKE ONE TABLET BY MOUTH 6 days a week, Disp: 30 tablet, Rfl: 11    atorvastatin (LIPITOR) 10 MG tablet, , Disp: , Rfl:     calcium carbonate 1500 (600 Ca) MG TABS tablet, Take 2 tablets by mouth, Disp: , Rfl:     Omega-3 Fatty Acids (FISH OIL) 1000 MG CAPS, Take 1,000 mg by mouth, Disp: , Rfl:     calcium carbonate 600 MG TABS tablet, Take 2 tablets by mouth daily, Disp: , Rfl:     Omega-3 Fatty Acids (FISH OIL) 1000 MG CAPS, Take 1,000 mg by mouth 2 times daily, Disp: , Rfl:     Multiple Vitamins-Minerals (MULTIVITAMIN WOMEN 50+) TABS, Take 1 tablet by mouth daily, Disp: , Rfl:     vitamin B-12 (CYANOCOBALAMIN) 1000 MCG tablet, Take 1,000 mcg by mouth daily, Disp: , Rfl:     lisinopril (PRINIVIL;ZESTRIL) 20 MG tablet, Take 20 mg by mouth daily, Disp: , Rfl:     atorvastatin (LIPITOR) 20 MG tablet, Take 20 mg by mouth daily. , Disp: , Rfl:   Lab Results   Component Value Date     04/30/2019    K 5.0 (H) 04/30/2019     04/30/2019    CO2 28 04/30/2019    BUN 19 04/30/2019    CREATININE 0.55 04/30/2019    GLUCOSE 96 04/30/2019    CALCIUM 9.6 04/30/2019    PROT 6.6 04/30/2019    LABALBU 4.3 04/30/2019    BILITOT 0.5 04/30/2019    ALKPHOS 66 04/30/2019    AST 25 04/30/2019    ALT 22 04/30/2019    LABGLOM >60.0 04/30/2019    GFRAA >60.0 04/30/2019    GLOB 2.3 04/30/2019     Lab Results   Component Value Date    WBC 8.7 07/30/2019    HGB 13.2 07/30/2019    HCT 40.3 07/30/2019     07/30/2019     No results found for: LABA1C  No results found for: CHOLFAST, TRIGLYCFAST, HDL, LDLCALC, CHOL, TRIG  No results found for: TESTM  Lab Results   Component Value Date    TSH 0.654 10/12/2022    TSH 1.650 10/12/2020    TSH 1.960 10/11/2019    TSHREFLEX 0.579 10/12/2021    T4FREE 1.73 (H) 10/12/2022    T4FREE 1.46 10/12/2021    T4FREE 1.50 10/12/2020     No results found for: TPOABS    Review of Systems   Cardiovascular: Negative. Endocrine: Negative for cold intolerance and heat intolerance. Hematological: Negative. All other systems reviewed and are negative. Objective:   Physical Exam  Vitals reviewed. Constitutional:       General: She is not in acute distress. Appearance: Normal appearance. HENT:      Head: Normocephalic and atraumatic.       Right Ear: External ear normal.      Left Ear: External ear normal. Nose: Nose normal.   Eyes:      General: No scleral icterus. Right eye: No discharge. Left eye: No discharge. Extraocular Movements: Extraocular movements intact. Conjunctiva/sclera: Conjunctivae normal.   Cardiovascular:      Rate and Rhythm: Normal rate. Pulmonary:      Effort: Pulmonary effort is normal.   Musculoskeletal:         General: Normal range of motion. Cervical back: Normal range of motion and neck supple. Neurological:      General: No focal deficit present. Mental Status: She is alert and oriented to person, place, and time.    Psychiatric:         Mood and Affect: Mood normal.         Behavior: Behavior normal.

## 2023-10-12 DIAGNOSIS — C73 THYROID CANCER (HCC): ICD-10-CM

## 2023-10-12 DIAGNOSIS — E89.0 POSTOPERATIVE HYPOTHYROIDISM: ICD-10-CM

## 2023-10-12 LAB
ANION GAP SERPL CALCULATED.3IONS-SCNC: 11 MEQ/L (ref 9–15)
BUN SERPL-MCNC: 20 MG/DL (ref 8–23)
CALCIUM SERPL-MCNC: 9.8 MG/DL (ref 8.5–9.9)
CHLORIDE SERPL-SCNC: 103 MEQ/L (ref 95–107)
CO2 SERPL-SCNC: 27 MEQ/L (ref 20–31)
CREAT SERPL-MCNC: 0.77 MG/DL (ref 0.5–0.9)
GLUCOSE SERPL-MCNC: 98 MG/DL (ref 70–99)
POTASSIUM SERPL-SCNC: 4.3 MEQ/L (ref 3.4–4.9)
SODIUM SERPL-SCNC: 141 MEQ/L (ref 135–144)
T4 FREE SERPL-MCNC: 1.49 NG/DL (ref 0.84–1.68)
TSH REFLEX: 0.47 UIU/ML (ref 0.44–3.86)

## 2023-10-17 LAB — THYROGLOB SERPL-MCNC: <0.5 NG/ML (ref 1.3–31.8)

## 2023-10-19 ENCOUNTER — OFFICE VISIT (OUTPATIENT)
Dept: ENDOCRINOLOGY | Age: 83
End: 2023-10-19
Payer: MEDICARE

## 2023-10-19 VITALS
DIASTOLIC BLOOD PRESSURE: 73 MMHG | OXYGEN SATURATION: 96 % | HEART RATE: 71 BPM | SYSTOLIC BLOOD PRESSURE: 132 MMHG | BODY MASS INDEX: 19.66 KG/M2 | WEIGHT: 118 LBS | HEIGHT: 65 IN

## 2023-10-19 DIAGNOSIS — E89.0 POSTOPERATIVE HYPOTHYROIDISM: ICD-10-CM

## 2023-10-19 DIAGNOSIS — C73 THYROID CANCER (HCC): Primary | ICD-10-CM

## 2023-10-19 PROCEDURE — G8400 PT W/DXA NO RESULTS DOC: HCPCS | Performed by: INTERNAL MEDICINE

## 2023-10-19 PROCEDURE — 99213 OFFICE O/P EST LOW 20 MIN: CPT | Performed by: INTERNAL MEDICINE

## 2023-10-19 PROCEDURE — 1036F TOBACCO NON-USER: CPT | Performed by: INTERNAL MEDICINE

## 2023-10-19 PROCEDURE — G8420 CALC BMI NORM PARAMETERS: HCPCS | Performed by: INTERNAL MEDICINE

## 2023-10-19 PROCEDURE — G8427 DOCREV CUR MEDS BY ELIG CLIN: HCPCS | Performed by: INTERNAL MEDICINE

## 2023-10-19 PROCEDURE — G8484 FLU IMMUNIZE NO ADMIN: HCPCS | Performed by: INTERNAL MEDICINE

## 2023-10-19 PROCEDURE — 1090F PRES/ABSN URINE INCON ASSESS: CPT | Performed by: INTERNAL MEDICINE

## 2023-10-19 PROCEDURE — 1123F ACP DISCUSS/DSCN MKR DOCD: CPT | Performed by: INTERNAL MEDICINE

## 2023-10-19 RX ORDER — LISINOPRIL 30 MG/1
TABLET ORAL
COMMUNITY
Start: 2023-10-17

## 2023-10-19 RX ORDER — LEVOTHYROXINE SODIUM 0.07 MG/1
TABLET ORAL
Qty: 30 TABLET | Refills: 11 | Status: SHIPPED | OUTPATIENT
Start: 2023-10-19

## 2023-10-19 NOTE — PROGRESS NOTES
Results   Component Value Date     10/12/2023    K 4.3 10/12/2023     10/12/2023    CO2 27 10/12/2023    BUN 20 10/12/2023    CREATININE 0.77 10/12/2023    GLUCOSE 98 10/12/2023    CALCIUM 9.8 10/12/2023    PROT 6.6 04/30/2019    LABALBU 4.3 04/30/2019    BILITOT 0.5 04/30/2019    ALKPHOS 66 04/30/2019    AST 25 04/30/2019    ALT 22 04/30/2019    LABGLOM >60.0 10/12/2023    GFRAA >60.0 04/30/2019    GLOB 2.3 04/30/2019     Lab Results   Component Value Date    WBC 8.7 07/30/2019    HGB 13.2 07/30/2019    HCT 40.3 07/30/2019     07/30/2019     No results found for: \"LABA1C\"  No results found for: \"CHOLFAST\", \"TRIGLYCFAST\", \"HDL\", \"LDLCALC\", \"CHOL\", \"TRIG\"  No results found for: \"TESTM\"  Lab Results   Component Value Date    TSH 0.654 10/12/2022    TSH 1.650 10/12/2020    TSH 1.960 10/11/2019    TSHREFLEX 0.465 10/12/2023    TSHREFLEX 0.579 10/12/2021    T4FREE 1.49 10/12/2023    T4FREE 1.73 (H) 10/12/2022    T4FREE 1.46 10/12/2021     No results found for: \"TPOABS\"    Review of Systems   Constitutional:  Positive for weight loss. Cardiovascular:  Negative for palpitations. Endocrine: Negative for cold intolerance and heat intolerance. Neurological:  Negative for tremors. Objective:   Physical Exam  Vitals reviewed. Constitutional:       General: She is not in acute distress. Appearance: Normal appearance. HENT:      Head: Normocephalic and atraumatic. Right Ear: External ear normal.      Left Ear: External ear normal.      Nose: Nose normal.   Eyes:      General: No scleral icterus. Right eye: No discharge. Left eye: No discharge. Extraocular Movements: Extraocular movements intact. Conjunctiva/sclera: Conjunctivae normal.   Cardiovascular:      Rate and Rhythm: Normal rate. Pulmonary:      Effort: Pulmonary effort is normal.   Musculoskeletal:         General: Normal range of motion. Cervical back: Normal range of motion and neck supple.

## 2024-10-11 DIAGNOSIS — E89.0 POSTOPERATIVE HYPOTHYROIDISM: ICD-10-CM

## 2024-10-11 LAB
THYROXINE (T4): 10.5 UG/DL (ref 4.5–11.7)
TSH SERPL-MCNC: 0.64 UIU/ML (ref 0.44–3.86)

## 2024-10-16 LAB — THYROGLOB SERPL-MCNC: <0.5 NG/ML (ref 1.3–31.8)

## 2024-10-17 ENCOUNTER — OFFICE VISIT (OUTPATIENT)
Dept: ENDOCRINOLOGY | Age: 84
End: 2024-10-17
Payer: MEDICARE

## 2024-10-17 VITALS
HEART RATE: 75 BPM | WEIGHT: 113 LBS | BODY MASS INDEX: 18.83 KG/M2 | DIASTOLIC BLOOD PRESSURE: 69 MMHG | HEIGHT: 65 IN | SYSTOLIC BLOOD PRESSURE: 146 MMHG | OXYGEN SATURATION: 98 %

## 2024-10-17 DIAGNOSIS — E89.0 POSTOPERATIVE HYPOTHYROIDISM: Primary | ICD-10-CM

## 2024-10-17 DIAGNOSIS — C73 THYROID CANCER (HCC): ICD-10-CM

## 2024-10-17 PROCEDURE — G8427 DOCREV CUR MEDS BY ELIG CLIN: HCPCS | Performed by: INTERNAL MEDICINE

## 2024-10-17 PROCEDURE — G8420 CALC BMI NORM PARAMETERS: HCPCS | Performed by: INTERNAL MEDICINE

## 2024-10-17 PROCEDURE — G8399 PT W/DXA RESULTS DOCUMENT: HCPCS | Performed by: INTERNAL MEDICINE

## 2024-10-17 PROCEDURE — 1036F TOBACCO NON-USER: CPT | Performed by: INTERNAL MEDICINE

## 2024-10-17 PROCEDURE — G8484 FLU IMMUNIZE NO ADMIN: HCPCS | Performed by: INTERNAL MEDICINE

## 2024-10-17 PROCEDURE — 1090F PRES/ABSN URINE INCON ASSESS: CPT | Performed by: INTERNAL MEDICINE

## 2024-10-17 PROCEDURE — 1123F ACP DISCUSS/DSCN MKR DOCD: CPT | Performed by: INTERNAL MEDICINE

## 2024-10-17 PROCEDURE — 99213 OFFICE O/P EST LOW 20 MIN: CPT | Performed by: INTERNAL MEDICINE

## 2024-10-17 RX ORDER — LEVOTHYROXINE SODIUM 75 UG/1
TABLET ORAL
Qty: 30 TABLET | Refills: 11 | Status: SHIPPED | OUTPATIENT
Start: 2024-10-17

## 2024-10-17 NOTE — PROGRESS NOTES
10/17/2024    Assessment:       Diagnosis Orders   1. Postoperative hypothyroidism        2. Thyroid cancer (HCC)              PLAN:     Orders Placed This Encounter   Procedures    T4, Free     Standing Status:   Future     Standing Expiration Date:   10/17/2025    TSH with Reflex     Standing Status:   Future     Standing Expiration Date:   10/17/2025    Anti-Thyroglobulin Antibody     Standing Status:   Future     Standing Expiration Date:   10/17/2025    Thyroglobulin     Standing Status:   Future     Standing Expiration Date:   10/17/2025     Orders Placed This Encounter   Medications    levothyroxine (SYNTHROID) 75 MCG tablet     Sig: TAKE ONE TABLET BY MOUTH 6 days a week     Dispense:  30 tablet     Refill:  11       Subjective:     Chief Complaint   Patient presents with    Hypothyroidism     Thyroid cancer     Vitals:    10/17/24 0859 10/17/24 0904   BP: (!) 146/69 (!) 146/69   Pulse: 75    SpO2: 98%    Weight: 51.3 kg (113 lb)    Height: 1.651 m (5' 5\")      Wt Readings from Last 3 Encounters:   10/17/24 51.3 kg (113 lb)   10/19/23 53.5 kg (118 lb)   10/20/22 55.8 kg (123 lb)     BP Readings from Last 3 Encounters:   10/17/24 (!) 146/69   10/19/23 132/73   10/20/22 (!) 156/72     Follow-up on hypothyroidism history of thyroid cancer thyroglobulin level was less than 0.5 currently on Synthroid 75 mcg daily 6 days a week requesting refills    Thyroid Problem  Presents for follow-up visit. Patient reports no cold intolerance, heat intolerance, palpitations or tremors. The symptoms have been stable.     Past Medical History:   Diagnosis Date    Arthritis     Cancer (HCC)     thyroid,papillary type    Hypothyroidism     Osteopenia      Past Surgical History:   Procedure Laterality Date    APPENDECTOMY      BREAST BIOPSY Right     BENIGN    CATARACT REMOVAL Bilateral 07/2016    LITHOTRIPSY      KIDNEY STONES    TOTAL KNEE ARTHROPLASTY Right 01/14/2019     Social History     Socioeconomic History    Marital